# Patient Record
Sex: FEMALE | Race: ASIAN | URBAN - METROPOLITAN AREA
[De-identification: names, ages, dates, MRNs, and addresses within clinical notes are randomized per-mention and may not be internally consistent; named-entity substitution may affect disease eponyms.]

---

## 2017-03-27 ENCOUNTER — OFFICE VISIT (OUTPATIENT)
Dept: URGENT CARE | Facility: URGENT CARE | Age: 22
End: 2017-03-27
Payer: COMMERCIAL

## 2017-03-27 VITALS
HEIGHT: 61 IN | WEIGHT: 89.9 LBS | BODY MASS INDEX: 16.97 KG/M2 | DIASTOLIC BLOOD PRESSURE: 62 MMHG | OXYGEN SATURATION: 100 % | SYSTOLIC BLOOD PRESSURE: 100 MMHG | HEART RATE: 76 BPM | TEMPERATURE: 98.1 F

## 2017-03-27 DIAGNOSIS — D50.9 IRON DEFICIENCY ANEMIA, UNSPECIFIED IRON DEFICIENCY ANEMIA TYPE: ICD-10-CM

## 2017-03-27 DIAGNOSIS — D64.9 ANEMIA, UNSPECIFIED TYPE: Primary | ICD-10-CM

## 2017-03-27 DIAGNOSIS — R30.0 DYSURIA: ICD-10-CM

## 2017-03-27 DIAGNOSIS — R35.0 URINARY FREQUENCY: ICD-10-CM

## 2017-03-27 DIAGNOSIS — R53.83 OTHER FATIGUE: ICD-10-CM

## 2017-03-27 LAB
ALBUMIN SERPL-MCNC: 3.2 G/DL (ref 3.4–5)
ALBUMIN UR-MCNC: NEGATIVE MG/DL
ALP SERPL-CCNC: 40 U/L (ref 40–150)
ALT SERPL W P-5'-P-CCNC: 18 U/L (ref 0–50)
ANION GAP SERPL CALCULATED.3IONS-SCNC: 8 MMOL/L (ref 3–14)
APPEARANCE UR: CLEAR
AST SERPL W P-5'-P-CCNC: 14 U/L (ref 0–45)
BASOPHILS # BLD AUTO: 0.1 10E9/L (ref 0–0.2)
BASOPHILS NFR BLD AUTO: 0.9 %
BETA HCG QUAL IFA URINE: NEGATIVE
BILIRUB SERPL-MCNC: 0.3 MG/DL (ref 0.2–1.3)
BILIRUB UR QL STRIP: NEGATIVE
BUN SERPL-MCNC: 11 MG/DL (ref 7–30)
CALCIUM SERPL-MCNC: 8.3 MG/DL (ref 8.5–10.1)
CHLORIDE SERPL-SCNC: 108 MMOL/L (ref 94–109)
CO2 SERPL-SCNC: 26 MMOL/L (ref 20–32)
COLOR UR AUTO: YELLOW
CREAT SERPL-MCNC: 0.61 MG/DL (ref 0.52–1.04)
DIFFERENTIAL METHOD BLD: ABNORMAL
EOSINOPHIL # BLD AUTO: 0.1 10E9/L (ref 0–0.7)
EOSINOPHIL NFR BLD AUTO: 1.7 %
ERYTHROCYTE [DISTWIDTH] IN BLOOD BY AUTOMATED COUNT: 17.6 % (ref 10–15)
GFR SERPL CREATININE-BSD FRML MDRD: ABNORMAL ML/MIN/1.7M2
GLUCOSE SERPL-MCNC: 88 MG/DL (ref 70–99)
GLUCOSE UR STRIP-MCNC: NEGATIVE MG/DL
HCT VFR BLD AUTO: 22.3 % (ref 35–47)
HGB BLD-MCNC: 6.4 G/DL (ref 11.7–15.7)
HGB UR QL STRIP: NEGATIVE
KETONES UR STRIP-MCNC: NEGATIVE MG/DL
LEUKOCYTE ESTERASE UR QL STRIP: NEGATIVE
LYMPHOCYTES # BLD AUTO: 2.3 10E9/L (ref 0.8–5.3)
LYMPHOCYTES NFR BLD AUTO: 33.2 %
MCH RBC QN AUTO: 19.6 PG (ref 26.5–33)
MCHC RBC AUTO-ENTMCNC: 28.7 G/DL (ref 31.5–36.5)
MCV RBC AUTO: 68 FL (ref 78–100)
MONOCYTES # BLD AUTO: 0.7 10E9/L (ref 0–1.3)
MONOCYTES NFR BLD AUTO: 9.9 %
NEUTROPHILS # BLD AUTO: 3.8 10E9/L (ref 1.6–8.3)
NEUTROPHILS NFR BLD AUTO: 54.3 %
NITRATE UR QL: NEGATIVE
PH UR STRIP: 6 PH (ref 5–7)
PLATELET # BLD AUTO: 505 10E9/L (ref 150–450)
POTASSIUM SERPL-SCNC: 4 MMOL/L (ref 3.4–5.3)
PROT SERPL-MCNC: 6.2 G/DL (ref 6.8–8.8)
RBC # BLD AUTO: 3.26 10E12/L (ref 3.8–5.2)
SODIUM SERPL-SCNC: 142 MMOL/L (ref 133–144)
SP GR UR STRIP: 1.02 (ref 1–1.03)
URN SPEC COLLECT METH UR: NORMAL
UROBILINOGEN UR STRIP-ACNC: 0.2 EU/DL (ref 0.2–1)
WBC # BLD AUTO: 6.9 10E9/L (ref 4–11)

## 2017-03-27 PROCEDURE — 85025 COMPLETE CBC W/AUTO DIFF WBC: CPT | Performed by: PHYSICIAN ASSISTANT

## 2017-03-27 PROCEDURE — 80053 COMPREHEN METABOLIC PANEL: CPT | Performed by: PHYSICIAN ASSISTANT

## 2017-03-27 PROCEDURE — 87086 URINE CULTURE/COLONY COUNT: CPT | Performed by: PHYSICIAN ASSISTANT

## 2017-03-27 PROCEDURE — 36415 COLL VENOUS BLD VENIPUNCTURE: CPT | Performed by: PHYSICIAN ASSISTANT

## 2017-03-27 PROCEDURE — 99214 OFFICE O/P EST MOD 30 MIN: CPT | Performed by: PHYSICIAN ASSISTANT

## 2017-03-27 PROCEDURE — 84703 CHORIONIC GONADOTROPIN ASSAY: CPT | Performed by: PHYSICIAN ASSISTANT

## 2017-03-27 PROCEDURE — 81003 URINALYSIS AUTO W/O SCOPE: CPT | Performed by: FAMILY MEDICINE

## 2017-03-27 RX ORDER — FERROUS SULFATE 325(65) MG
325 TABLET ORAL
Qty: 30 TABLET | Refills: 2 | Status: SHIPPED | OUTPATIENT
Start: 2017-03-27

## 2017-03-27 NOTE — PROGRESS NOTES
"SUBJECTIVE:   Byron Cruz is a 22 year old female presenting with a chief complaint of having urinary frequency and feeling thirsty all the time.  She has been craving eating certain types of fruits.  Onset of symptoms was last few months, but worse the last few weeks .  Course of illness is same.    Severity moderate  Current and Associated symptoms: mild fatigue  Treatment measures tried include none.  Predisposing factors include hx of anemia.    PMH:  Anemia    ALLERGIES   No Known Allergies      Social History   Substance Use Topics     Smoking status: Never Smoker     Smokeless tobacco: Not on file     Alcohol use No       ROS:  CONSTITUTIONAL:NEGATIVE for fever, chills, change in weight  INTEGUMENTARY/SKIN: NEGATIVE for worrisome rashes, moles or lesions  EYES: NEGATIVE for vision changes or irritation  ENT/MOUTH: NEGATIVE for ear, mouth and throat problems  RESP:NEGATIVE for significant cough or SOB  CV: NEGATIVE for chest pain, palpitations or peripheral edema  GI: NEGATIVE for nausea, abdominal pain, heartburn, or change in bowel habits  MUSCULOSKELETAL: NEGATIVE for significant arthralgias or myalgia  NEURO: NEGATIVE for weakness, dizziness or paresthesias    OBJECTIVE  :/62  Pulse 76  Temp 98.1  F (36.7  C) (Oral)  Ht 5' 1\" (1.549 m)  Wt 89 lb 14.4 oz (40.8 kg)  SpO2 100%  BMI 16.99 kg/m2  GENERAL APPEARANCE: healthy, alert and no distress  EYES: EOMI,  PERRL, conjunctiva clear  HENT: ear canals and TM's normal.  Nose and mouth without ulcers, erythema or lesions  NECK: supple, nontender, no lymphadenopathy  RESP: lungs clear to auscultation - no rales, rhonchi or wheezes  CV: regular rates and rhythm, normal S1 S2, no murmur noted  ABDOMEN:  soft, nontender, no HSM or masses and bowel sounds normal  NEURO: Normal strength and tone, sensory exam grossly normal,  normal speech and mentation  SKIN: no suspicious lesions or rashes    Results for orders placed or performed in visit on 03/27/17 "   *UA reflex to Microscopic   Result Value Ref Range    Color Urine Yellow     Appearance Urine Clear     Glucose Urine Negative NEG mg/dL    Bilirubin Urine Negative NEG    Ketones Urine Negative NEG mg/dL    Specific Gravity Urine 1.020 1.003 - 1.035    Blood Urine Negative NEG    pH Urine 6.0 5.0 - 7.0 pH    Protein Albumin Urine Negative NEG mg/dL    Urobilinogen Urine 0.2 0.2 - 1.0 EU/dL    Nitrite Urine Negative NEG    Leukocyte Esterase Urine Negative NEG    Source Midstream Urine    Beta HCG qual IFA urine   Result Value Ref Range    Beta HCG Qual IFA Urine Negative NEG   CBC with platelets differential   Result Value Ref Range    WBC 6.9 4.0 - 11.0 10e9/L    RBC Count 3.26 (L) 3.8 - 5.2 10e12/L    Hemoglobin 6.4 (LL) 11.7 - 15.7 g/dL    Hematocrit 22.3 (L) 35.0 - 47.0 %    MCV 68 (L) 78 - 100 fl    MCH 19.6 (L) 26.5 - 33.0 pg    MCHC 28.7 (L) 31.5 - 36.5 g/dL    RDW 17.6 (H) 10.0 - 15.0 %    Platelet Count 505 (H) 150 - 450 10e9/L    Diff Method Automated Method     % Neutrophils 54.3 %    % Lymphocytes 33.2 %    % Monocytes 9.9 %    % Eosinophils 1.7 %    % Basophils 0.9 %    Absolute Neutrophil 3.8 1.6 - 8.3 10e9/L    Absolute Lymphocytes 2.3 0.8 - 5.3 10e9/L    Absolute Monocytes 0.7 0.0 - 1.3 10e9/L    Absolute Eosinophils 0.1 0.0 - 0.7 10e9/L    Absolute Basophils 0.1 0.0 - 0.2 10e9/L   Comprehensive metabolic panel   Result Value Ref Range    Sodium 142 133 - 144 mmol/L    Potassium 4.0 3.4 - 5.3 mmol/L    Chloride 108 94 - 109 mmol/L    Carbon Dioxide 26 20 - 32 mmol/L    Anion Gap 8 3 - 14 mmol/L    Glucose 88 70 - 99 mg/dL    Urea Nitrogen 11 7 - 30 mg/dL    Creatinine 0.61 0.52 - 1.04 mg/dL    GFR Estimate >90  Non  GFR Calc   >60 mL/min/1.7m2    GFR Estimate If Black >90   GFR Calc   >60 mL/min/1.7m2    Calcium 8.3 (L) 8.5 - 10.1 mg/dL    Bilirubin Total 0.3 0.2 - 1.3 mg/dL    Albumin 3.2 (L) 3.4 - 5.0 g/dL    Protein Total 6.2 (L) 6.8 - 8.8 g/dL    Alkaline  Phosphatase 40 40 - 150 U/L    ALT 18 0 - 50 U/L    AST 14 0 - 45 U/L       ASSESSMENT/PLAN:      ICD-10-CM    1. Anemia, unspecified type D64.9 GASTROENTEROLOGY ADULT REF CONSULT ONLY   2. Dysuria R30.0 *UA reflex to Microscopic     Beta HCG qual IFA urine     CBC with platelets differential     Urine Culture Aerobic Bacterial   3. Urinary frequency R35.0 CBC with platelets differential     Comprehensive metabolic panel   4. Other fatigue R53.83 **TSH with free T4 reflex FUTURE 1yr     ferrous sulfate (IRON) 325 (65 FE) MG tablet   5. Iron deficiency anemia, unspecified iron deficiency anemia type D50.9 ferrous sulfate (IRON) 325 (65 FE) MG tablet       Discussed case with ED due to critical low value of Hgb.  At this time patient is hemodynamically stable and looking stable and appearing fine  We will not have her transfused at this time  I have contacted GI specialty and talked with them  Patient is going to be scheduled for Thursday of rFriday this week  She is wait on taking Iron for now

## 2017-03-27 NOTE — MR AVS SNAPSHOT
"              After Visit Summary   3/27/2017    Byron Cruz    MRN: [id number]           Patient Information     Date Of Birth          1995        Visit Information        Provider Department      3/27/2017 11:15 AM Demarco Clemons PA-C [facility]        Today's Diagnoses     Anemia, unspecified type    -  1    Dysuria        Urinary frequency        Other fatigue           Follow-ups after your visit        Future tests that were ordered for you today     Open Future Orders        Priority Expected Expires Ordered    **TSH with free T4 reflex FUTURE 1yr Routine 2/25/2018 3/27/2018 3/27/2017            Who to contact     If you have questions or need follow up information about today's clinic visit or your schedule please contact [facility] directly at [phone].  Normal or non-critical lab and imaging results will be communicated to you by MyChart, letter or phone within 4 business days after the clinic has received the results. If you do not hear from us within 7 days, please contact the clinic through MyChart or phone. If you have a critical or abnormal lab result, we will notify you by phone as soon as possible.  Submit refill requests through [employer] or call your pharmacy and they will forward the refill request to us. Please allow 3 business days for your refill to be completed.          Additional Information About Your Visit        MyChart Information     [employer] lets you send messages to your doctor, view your test results, renew your prescriptions, schedule appointments and more. To sign up, go to [email or web address] . Click on \"Log in\" on the left side of the screen, which will take you to the Welcome page. Then click on \"Sign up Now\" on the right side of the page.     You will be asked to enter the access code listed below, as well as some personal information. Please follow the directions to create your username and password.   " "  Your access code is: [email or web address]  Expires: 3/29/2017  3:19 PM     Your access code will  in 90 days. If you need help or a new code, please call your [place] clinic or [phone].        Care EveryWhere ID     This is your Care EveryWhere ID. This could be used by other organizations to access your [place] medical records  [id number]        Your Vitals Were     Pulse Temperature Height Pulse Oximetry BMI (Body Mass Index)       76 98.1  F (36.7  C) (Oral) 5' 1\" (1.549 m) 100% 16.99 kg/m2        Blood Pressure from Last 3 Encounters:   17 100/62   16 100/60   16 110/52    Weight from Last 3 Encounters:   17 89 lb 14.4 oz (40.8 kg)   16 94 lb (42.6 kg)   16 84 lb (38.1 kg)              We Performed the Following     *UA reflex to Microscopic     Beta HCG qual IFA urine     CBC with platelets differential     Comprehensive metabolic panel     Urine Culture Aerobic Bacterial        Primary Care Provider    [facility]       No address on file        Thank you!     Thank you for choosing [facility]  for your care. Our goal is always to provide you with excellent care. Hearing back from our patients is one way we can continue to improve our services. Please take a few minutes to complete the written survey that you may receive in the mail after your visit with us. Thank you!             Your Updated Medication List - Protect others around you: Learn how to safely use, store and throw away your medicines at www.disposemymeds.org.          This list is accurate as of: 3/27/17  3:04 PM.  Always use your most recent med list.                   Brand Name Dispense Instructions for use    ADVIL PO      Reported on 3/27/2017       ferrous sulfate 325 (65 FE) MG tablet    IRON    30 tablet    Take 1 tablet (325 mg) by mouth daily (with breakfast)       ketotifen 0.025 % Soln ophthalmic solution    ZADITOR    1 Bottle    Place 1 drop into " both eyes every 12 hours

## 2017-03-27 NOTE — NURSING NOTE
"Chief Complaint   Patient presents with     UTI     thirst and night time frequency urination, only when laying down no burning sensation 3x weeks        Initial /62  Pulse 76  Temp 98.1  F (36.7  C) (Oral)  Ht 5' 1\" (1.549 m)  Wt 89 lb 14.4 oz (40.8 kg)  SpO2 100%  BMI 16.99 kg/m2 Estimated body mass index is 16.99 kg/(m^2) as calculated from the following:    Height as of this encounter: 5' 1\" (1.549 m).    Weight as of this encounter: 89 lb 14.4 oz (40.8 kg).  Medication Reconciliation: complete    "

## 2017-03-29 LAB
BACTERIA SPEC CULT: NORMAL
MICRO REPORT STATUS: NORMAL
SPECIMEN SOURCE: NORMAL

## 2017-03-31 ENCOUNTER — TRANSFERRED RECORDS (OUTPATIENT)
Dept: HEALTH INFORMATION MANAGEMENT | Facility: CLINIC | Age: 22
End: 2017-03-31

## 2017-03-31 LAB
ALT SERPL-CCNC: 11 IU/L (ref 0–32)
AST SERPL-CCNC: 17 IU/L (ref 0–40)
CREAT SERPL-MCNC: 0.59 MG/DL (ref 0.57–100)
GFR SERPL CREATININE-BSD FRML MDRD: 131 ML/MIN/1.73
GLUCOSE SERPL-MCNC: 87 MG/DL (ref 65–99)
POTASSIUM SERPL-SCNC: 5.1 MMOL/L (ref 3.5–5.2)

## 2017-04-13 ENCOUNTER — OFFICE VISIT (OUTPATIENT)
Dept: INTERNAL MEDICINE | Facility: CLINIC | Age: 22
End: 2017-04-13
Payer: COMMERCIAL

## 2017-04-13 VITALS
WEIGHT: 90.6 LBS | DIASTOLIC BLOOD PRESSURE: 52 MMHG | TEMPERATURE: 98 F | HEIGHT: 61 IN | BODY MASS INDEX: 17.11 KG/M2 | OXYGEN SATURATION: 100 % | HEART RATE: 73 BPM | SYSTOLIC BLOOD PRESSURE: 92 MMHG

## 2017-04-13 DIAGNOSIS — R63.6 UNDERWEIGHT: ICD-10-CM

## 2017-04-13 DIAGNOSIS — D64.9 ANEMIA, UNSPECIFIED: Primary | ICD-10-CM

## 2017-04-13 PROCEDURE — 99214 OFFICE O/P EST MOD 30 MIN: CPT | Performed by: INTERNAL MEDICINE

## 2017-04-13 NOTE — MR AVS SNAPSHOT
"              After Visit Summary   4/13/2017    Byron Cruz    MRN: [id number]           Patient Information     Date Of Birth          1995        Visit Information        Provider Department      4/13/2017 10:15 AM Suma Garza MD [facility]        Today's Diagnoses     Iron deficiency    -  1      Care Instructions    STOP current iron supplement.    START new, slow-release iron daily.    Take with orange juice.    Take daily, consistently.     Follow-up with me in 2-3 months (earlier as needed).     Ensure (nutrition supplement) 2-3x/day.    Try to eat more fatty foods like full-fat dairy, avocados, nuts, peanut butter.         Follow-ups after your visit        Who to contact     If you have questions or need follow up information about today's clinic visit or your schedule please contact [facility] directly at [phone].  Normal or non-critical lab and imaging results will be communicated to you by [employer], letter or phone within 4 business days after the clinic has received the results. If you do not hear from us within 7 days, please contact the clinic through [employer] or phone. If you have a critical or abnormal lab result, we will notify you by phone as soon as possible.  Submit refill requests through [employer] or call your pharmacy and they will forward the refill request to us. Please allow 3 business days for your refill to be completed.          Additional Information About Your Visit        MyChart Information     [employer] lets you send messages to your doctor, view your test results, renew your prescriptions, schedule appointments and more. To sign up, go to [email or web address] . Click on \"Log in\" on the left side of the screen, which will take you to the Welcome page. Then click on \"Sign up Now\" on the right side of the page.     You will be asked to enter the access code listed below, as well as some personal information. Please " "follow the directions to create your username and password.     Your access code is: [email or web address]  Expires: 2017 10:54 AM     Your access code will  in 90 days. If you need help or a new code, please call your [place] clinic or [phone].        Care EveryWhere ID     This is your Care EveryWhere ID. This could be used by other organizations to access your [place] medical records  [id number]        Your Vitals Were     Pulse Temperature Height Last Period Pulse Oximetry BMI (Body Mass Index)    73 98  F (36.7  C) (Oral) 5' 1\" (1.549 m) 2017 100% 17.12 kg/m2       Blood Pressure from Last 3 Encounters:   17 92/52   17 100/62   16 100/60    Weight from Last 3 Encounters:   17 90 lb 9.6 oz (41.1 kg)   17 89 lb 14.4 oz (40.8 kg)   16 94 lb (42.6 kg)              Today, you had the following     No orders found for display         Today's Medication Changes          These changes are accurate as of: 17 10:54 AM.  If you have any questions, ask your nurse or doctor.               These medicines have changed or have updated prescriptions.        Dose/Directions    * ferrous sulfate 325 (65 FE) MG tablet   Commonly known as:  IRON   This may have changed:  Another medication with the same name was added. Make sure you understand how and when to take each.   Used for:  Iron deficiency anemia, unspecified iron deficiency anemia type, Other fatigue   Changed by:  Demarco Clemons, PAGeovannaC        Dose:  325 mg   Take 1 tablet (325 mg) by mouth daily (with breakfast)   Quantity:  30 tablet   Refills:  2       * ferrous sulfate 142 (45 FE) MG Tbcr   Commonly known as:  SLO-FE   This may have changed:  You were already taking a medication with the same name, and this prescription was added. Make sure you understand how and when to take each.   Used for:  Iron deficiency   Changed by:  Suma Garza MD        Dose:  142 mg   Take 1 tablet (142 mg) by mouth daily "   Quantity:  90 tablet   Refills:  3       * Notice:  This list has 2 medication(s) that are the same as other medications prescribed for you. Read the directions carefully, and ask your doctor or other care provider to review them with you.         Where to get your medicines      These medications were sent to [facility] [address]  [address], [facility] [address]     Phone:  [phone]     ferrous sulfate 142 (45 FE) MG Tbcr                Primary Care Provider    [facility]       No address on file        Thank you!     Thank you for choosing [facility]  for your care. Our goal is always to provide you with excellent care. Hearing back from our patients is one way we can continue to improve our services. Please take a few minutes to complete the written survey that you may receive in the mail after your visit with us. Thank you!             Your Updated Medication List - Protect others around you: Learn how to safely use, store and throw away your medicines at www.disposemymeds.org.          This list is accurate as of: 4/13/17 10:54 AM.  Always use your most recent med list.                   Brand Name Dispense Instructions for use    ADVIL PO      Reported on 4/13/2017       * ferrous sulfate 325 (65 FE) MG tablet    IRON    30 tablet    Take 1 tablet (325 mg) by mouth daily (with breakfast)       * ferrous sulfate 142 (45 FE) MG Tbcr    SLO-FE    90 tablet    Take 1 tablet (142 mg) by mouth daily       * Notice:  This list has 2 medication(s) that are the same as other medications prescribed for you. Read the directions carefully, and ask your doctor or other care provider to review them with you.

## 2017-04-13 NOTE — NURSING NOTE
"Chief Complaint   Patient presents with     Anemia       Initial BP 92/52  Pulse 73  Temp 98  F (36.7  C) (Oral)  Ht 5' 1\" (1.549 m)  Wt 90 lb 9.6 oz (41.1 kg)  LMP 04/07/2017  SpO2 100%  BMI 17.12 kg/m2 Estimated body mass index is 17.12 kg/(m^2) as calculated from the following:    Height as of this encounter: 5' 1\" (1.549 m).    Weight as of this encounter: 90 lb 9.6 oz (41.1 kg).  Medication Reconciliation: complete   Asha Heaton MA   "

## 2017-04-13 NOTE — PATIENT INSTRUCTIONS
STOP current iron supplement.    START new, slow-release iron daily.    Take with orange juice.    Take daily, consistently.     Follow-up with me in 2-3 months (earlier as needed).     Ensure (nutrition supplement) 2-3x/day.    Try to eat more fatty foods like full-fat dairy, avocados, nuts, peanut butter.

## 2017-04-13 NOTE — PROGRESS NOTES
"  SUBJECTIVE:                                                      HPI: Byron Cruz is a pleasant 22 year old female who presents for follow-up of anemia:    - presented to urgent care 3/27/17 with urinary symptoms  - ROS also significant for fatigue and food cravings  - labs significant for anemia (Hgb 6.4); other RBC parameters (MCV, RDW) suggestive of iron deficiency  - patient was referred to GI for further evaluation   - saw gastroenterologist, labs were drawn (results unknown)   - no additional studies performed yet    - generally asymptomatic:   - no lightheadedness or presyncope   - no shortness of breath   - no palpitations   - very mild fatigue - but still performing everyday tasks without difficulty    - no abdominal pain, nausea or vomiting  - no anorexia or early satiety  - bowel movements are normal - daily, soft, formed, brown, and without hematochezia or melena    Patient has history of recurrent iron deficiency anemia, which has always responded to iron supplementation.    Patient is also chronically underweight - she is aware of this, and would like to gain weight, but not sure how. She has no diet restrictions in place. She does not exercise regularly.    Patient's periods are regular and she describes the flow as normal.    No other active medical problems.    No history of abdominal surgeries.    The medication, allergy, and problem lists have been reviewed and updated as appropriate.     Patient restarted on iron supplement approximately 2 weeks ago.       OBJECTIVE:                                                      BP 92/52  Pulse 73  Temp 98  F (36.7  C) (Oral)  Ht 5' 1\" (1.549 m)  Wt 90 lb 9.6 oz (41.1 kg)  LMP 04/07/2017  SpO2 100%  BMI 17.12 kg/m2  Constitutional: well-appearing  Respiratory: normal respiratory effort; clear to auscultation bilaterally  Cardiovascular: regular rate and rhythm; no edema  Gastrointestinal: thin, soft, non-tender, non-distended, and bowel sounds " present; no organomegaly or masses   Musculoskeletal: normal gait and station; no clubbing or cyanosis  Psych: normal judgment and insight; normal mood and affect; recent and remote memory intact      ASSESSMENT/PLAN:                                                      (D64.9) Anemia, unspecified  (primary encounter diagnosis)  Comment:    - history of recurrent iron deficiency, which has always responded to iron supplementation.   - though no iron studies performed, current anemia is likely iron deficiency anemia.   - iron deficiency anemia likely related to menstruation.   - no red flag symptoms to suggest GI pathology/alternative causes of iron deficiency.   - recommend holding on upper GI and colonoscopy for now, as they may not change initial management.  Plan:    - recommend trial of iron supplementation for next 2-3 months.    - current iron supplement replaced with slow release iron supplement (for better compliance).    - instructed to take iron with orange juice.    - patient encouraged to take supplement daily and consistently.   - follow-up with me in 2-3 months for CBC and iron studies.   - if she becomes symptomatic (lightheaded, presyncope, shortness of breath, excessive fatigue), she is to contact me.    - next step would be CBC and, if low, blood transfusion.    (R63.6) Underweight  Comment:    - chronic issue.   - patient is aware she is underweight and would like to gain weight.   - offered referral to nutritionist - patient declines.  Plan:    - patient encouraged to eat 3 meals + 3 snacks or 6 small meals daily.   - recommend drinking 2-3 Ensures per day in addition to regular meals and snacks.   - recommend diet rich with healthy, fatty foods like full fat dairy, avocados, and nuts.   - will follow-up in 2-3 months.    The instructions on the AVS were discussed and explained to the patient. Patient expressed understanding of instructions.    Suma Garza MD   [facility] -  [address] [place] [address]  T: [phone], F: [phone]

## 2019-06-03 ENCOUNTER — OFFICE VISIT (OUTPATIENT)
Dept: URGENT CARE | Facility: URGENT CARE | Age: 24
End: 2019-06-03
Payer: COMMERCIAL

## 2019-06-03 VITALS
WEIGHT: 101 LBS | BODY MASS INDEX: 19.07 KG/M2 | SYSTOLIC BLOOD PRESSURE: 106 MMHG | HEART RATE: 82 BPM | HEIGHT: 61 IN | DIASTOLIC BLOOD PRESSURE: 71 MMHG | TEMPERATURE: 97.9 F | OXYGEN SATURATION: 96 %

## 2019-06-03 DIAGNOSIS — R21 RASH: Primary | ICD-10-CM

## 2019-06-03 PROCEDURE — 99213 OFFICE O/P EST LOW 20 MIN: CPT | Performed by: FAMILY MEDICINE

## 2019-06-03 RX ORDER — TRIAMCINOLONE ACETONIDE 1 MG/G
CREAM TOPICAL 2 TIMES DAILY
Qty: 45 G | Refills: 1 | Status: SHIPPED | OUTPATIENT
Start: 2019-06-03 | End: 2019-07-03

## 2019-06-03 ASSESSMENT — MIFFLIN-ST. JEOR: SCORE: 1145.51

## 2019-06-03 NOTE — PROGRESS NOTES
"SUBJECTIVE:Byron Cruz is a 24 year old female who presents to the clinic today for a rash.  Onset of rash was 2 month(s) ago.   Rash is still present and worsening.   Location of the rash: arm, upper and neck.  Associated symptoms include: itching.    Symptoms are moderate and rash seems to be worsening.  Therapies tried to improve the rash: lotion.  Previous history of a similar rash? No  Recent exposure history: none known    No past medical history on file.  No Known Allergies  Social History     Tobacco Use     Smoking status: Never Smoker   Substance Use Topics     Alcohol use: No     Alcohol/week: 0.0 oz       ROS:CONSTITUTIONAL:NEGATIVE for fever, chills, change in weight    EXAM: VITALS: /71   Pulse 82   Temp 97.9  F (36.6  C) (Oral)   Ht 1.549 m (5' 1\")   Wt 45.8 kg (101 lb)   SpO2 96%   BMI 19.08 kg/m    General:healthy,alert,no distress    Location: arm, lower and neck     Distribution: diffuse/patchy     Lesion grouping: bilateral     Lesion type: macular     Color: skin color with no other findingsPERTINENT EXAM: GENERAL APPEARANCE: healthy, alert and no distress      ICD-10-CM    1. Rash R21 triamcinolone (KENALOG) 0.1 % external cream     Cont lotion  Follow-up with primary clinic if not improving    "